# Patient Record
Sex: MALE | Race: OTHER | HISPANIC OR LATINO | URBAN - METROPOLITAN AREA
[De-identification: names, ages, dates, MRNs, and addresses within clinical notes are randomized per-mention and may not be internally consistent; named-entity substitution may affect disease eponyms.]

---

## 2023-03-24 ENCOUNTER — EMERGENCY (EMERGENCY)
Facility: HOSPITAL | Age: 23
LOS: 1 days | End: 2023-03-24
Attending: EMERGENCY MEDICINE
Payer: COMMERCIAL

## 2023-03-24 VITALS
HEART RATE: 70 BPM | HEIGHT: 68 IN | TEMPERATURE: 98 F | WEIGHT: 160.06 LBS | RESPIRATION RATE: 18 BRPM | DIASTOLIC BLOOD PRESSURE: 84 MMHG | OXYGEN SATURATION: 98 % | SYSTOLIC BLOOD PRESSURE: 130 MMHG

## 2023-03-24 PROCEDURE — 99282 EMERGENCY DEPT VISIT SF MDM: CPT

## 2023-03-24 PROCEDURE — 99284 EMERGENCY DEPT VISIT MOD MDM: CPT

## 2023-03-24 RX ORDER — OXYCODONE AND ACETAMINOPHEN 5; 325 MG/1; MG/1
1 TABLET ORAL ONCE
Refills: 0 | Status: COMPLETED | OUTPATIENT
Start: 2023-03-24 | End: 2023-03-24

## 2023-03-24 NOTE — ED ADULT TRIAGE NOTE - CHIEF COMPLAINT QUOTE
Pt c/o pan to left testicle that radiates to left abdomen since last Friday, was seen in [facility] earlier today, and Randi earlier in the week was told it is a cyst, was given ibuprofen and doxycycline but pain persists.

## 2023-03-24 NOTE — ED ADULT NURSE NOTE - SKIN CAPILLARY REFILL
2 seconds or less Tetracycline Counseling: Patient counseled regarding possible photosensitivity and increased risk for sunburn.  Patient instructed to avoid sunlight, if possible.  When exposed to sunlight, patients should wear protective clothing, sunglasses, and sunscreen.  The patient was instructed to call the office immediately if the following severe adverse effects occur:  hearing changes, easy bruising/bleeding, severe headache, or vision changes.  The patient verbalized understanding of the proper use and possible adverse effects of tetracycline.  All of the patient's questions and concerns were addressed. Patient understands to avoid pregnancy while on therapy due to potential birth defects.

## 2023-03-24 NOTE — ED PROVIDER NOTE - CLINICAL SUMMARY MEDICAL DECISION MAKING FREE TEXT BOX
23 yo M with L testicular pain x 1 week. Seen in ED x 2 and had US x2 and CT done. Dx with epididymitis, on Abx and ibuprofen. Will eval for torsion. Will give pain meds. reassess.

## 2023-03-24 NOTE — ED PROVIDER NOTE - OBJECTIVE STATEMENT
21 yo M with L testicular pain x 1 week. Went to ED at [facility] and [place] 3/22/23 and 3/23/23, has US x 2 and CT A/P, dx with epididymitis and started on doxycycline and ibuprofen. Pt c/o pain still persists. States he did not make an appt with urologist as advised. No fever, hematuria, dysuria. No N/V or abd pain. Denies trauma or injury

## 2023-03-24 NOTE — ED ADULT NURSE NOTE - CHIEF COMPLAINT QUOTE
Pt c/o pan to left testicle that radiates to left abdomen since last Friday, was seen in [facility] earlier today, and Samantha earlier in the week was told it is a cyst, was given ibuprofen and doxycycline but pain persists.

## 2023-03-24 NOTE — ED ADULT NURSE NOTE - OBJECTIVE STATEMENT
23 y/o male received ambulatory from triage. Alert and oriented x4. C/o left sided abdomen pain radiating to left testicle. Pt diagnosed at [facility] and [facility] with cyst. Oral antibiotics given, pt reports minimal improvement.